# Patient Record
Sex: MALE | Race: WHITE | URBAN - METROPOLITAN AREA
[De-identification: names, ages, dates, MRNs, and addresses within clinical notes are randomized per-mention and may not be internally consistent; named-entity substitution may affect disease eponyms.]

---

## 2021-09-15 ENCOUNTER — OFFICE VISIT (OUTPATIENT)
Dept: SLEEP MEDICINE | Age: 41
End: 2021-09-15
Payer: COMMERCIAL

## 2021-09-15 VITALS
BODY MASS INDEX: 25.17 KG/M2 | OXYGEN SATURATION: 100 % | HEIGHT: 72 IN | SYSTOLIC BLOOD PRESSURE: 110 MMHG | DIASTOLIC BLOOD PRESSURE: 69 MMHG | WEIGHT: 185.8 LBS | HEART RATE: 63 BPM

## 2021-09-15 DIAGNOSIS — G47.33 OSA (OBSTRUCTIVE SLEEP APNEA): Primary | ICD-10-CM

## 2021-09-15 PROCEDURE — 99204 OFFICE O/P NEW MOD 45 MIN: CPT | Performed by: SPECIALIST

## 2021-09-15 NOTE — PROGRESS NOTES
217 [facility] [place], [address]  Tel.  [phone]  Fax. 100 [facility] [address]  [place], [address] [facility]  Tel.  [phone]  Fax. [phone] [address]  Tel.  [phone]  Fax. [phone]       Chief Complaint       No chief complaint on file. SALAS Jacobo is 35 y.o. male seen for evaluation of a sleep disorder. He notes problems with nocturnal awakening. Previously would retire between 8: 309 PM and awaken at 4: 30 AM.  More recently he has been awakening at 1: 30 and experiencing difficulties returning to sleep. He has been told of snoring which is intermittent, primarily supine. He denies excessive daytime sleepiness. He has not experienced vivid dreaming nightmares, sleep talking or sleepwalking, bruxism or nocturnal incontinence, abnormal arm or leg movements, hypnagogic hallucinations, sleep paralysis or cataplexy. The patient has not undergone diagnostic testing for the current problems. [place] Sleepiness Score: 1       Not on File         He  has no past medical history on file. He  has no past surgical history on file. He family history is not on file. He       Review of Systems:  Review of Systems   Constitutional: Negative for chills and fever. HENT: Negative for hearing loss and tinnitus. Eyes: Negative for blurred vision and double vision. Respiratory: Negative for cough and shortness of breath. Cardiovascular: Negative for chest pain and palpitations. Gastrointestinal: Negative for heartburn and nausea. Genitourinary: Negative for frequency and urgency. Musculoskeletal: Negative for back pain and neck pain. Skin: Negative for itching and rash. Neurological: Negative for dizziness. Psychiatric/Behavioral: The patient is nervous/anxious and has insomnia.           Objective:     Visit Vitals  /69   Pulse 63   Ht 6' (1.829 m)   Wt 185 lb 12.8 oz (84.3 kg)   SpO2 100%   BMI 25.20 kg/m²     Body mass index is 25.2 kg/m². General:   Conversant, cooperative   Eyes:  Pupils equal and reactive, no nystagmus   Oropharynx:   Mallampati score II, tongue normal   Tonsils:   tonsils 2+   Neck:   No carotid bruits; Chest/Lungs:  Clear on auscultation    CVS:  Normal rate, regular rhythm   Skin:  Warm to touch; no obvious rashes   Neuro:  Speech fluent, face symmetrical, tongue movement normal   Psych:  Normal affect,  normal countenance        Assessment:       ICD-10-CM ICD-9-CM    1. RANJEET (obstructive sleep apnea)  G47.33 327.23 SPLIT CPAP/PSG      CANCELED: SLEEP STUDY UNATTENDED, 4 CHANNEL       Potential sleep disordered breathing prompting awakening. Patient does have a narrow posterior oral airway, secondary to enlarged tonsils. Potentially, sleep breathing abnormalities more prominent when supine, and/or in rem sleep. Sleep study will be obtained. Plan:     Orders Placed This Encounter    SPLIT CPAP/PSG     Standing Status:   Future     Standing Expiration Date:   3/18/2022     Order Specific Question:   Reason for Exam     Answer:   nocturnal awakening       * Patient has a history and examination consistent with the diagnosis of sleep apnea. * Sleep testing was ordered for initial evaluation. * He was provided information on sleep apnea including corresponding risk factors and the importance of proper treatment. * Treatment options if indicated were reviewed today. Instructions:  o Do not engage in activities requiring a normal degree of alertness if fatigue is present. o The patient understands that untreated or undertreated sleep apnea could impair judgement and the ability to function normally during the day.  o Call or return if symptoms worsen or persist.          Ignacia Emery MD, FAASM  Electronically signed 09/15/21       This note was created using voice recognition software.  Despite editing, there may be syntax errors. This note will not be viewable in [address]

## 2021-10-12 ENCOUNTER — HOSPITAL ENCOUNTER (OUTPATIENT)
Dept: SLEEP MEDICINE | Age: 41
Discharge: HOME | End: 2021-10-12
Payer: COMMERCIAL

## 2021-10-12 DIAGNOSIS — G47.33 OSA (OBSTRUCTIVE SLEEP APNEA): ICD-10-CM

## 2021-10-12 PROCEDURE — 95810 POLYSOM 6/> YRS 4/> PARAM: CPT | Performed by: SPECIALIST

## 2021-10-13 ENCOUNTER — DOCUMENTATION ONLY (OUTPATIENT)
Dept: SLEEP MEDICINE | Age: 41
End: 2021-10-13

## 2021-10-13 VITALS
HEART RATE: 71 BPM | TEMPERATURE: 98.4 F | OXYGEN SATURATION: 99 % | DIASTOLIC BLOOD PRESSURE: 68 MMHG | SYSTOLIC BLOOD PRESSURE: 108 MMHG

## 2021-10-13 NOTE — PROGRESS NOTES
217 [facility]., [place], [address]  Tel.  [phone]  Fax. 100 [facility] 60  [place], [address] [facility]  Tel.  [phone]  Fax. [phone] [address]  Tel.  [phone]  Fax. [phone]     Sleep Study Technical Notes        PRE-Test:  · Cabrera Foster (: 1980) arrived in the lobby. Patient was greeted, temperature checked (98.4 ) and screening questions asked. The patient was taken to the [facility] Center and taken directly to his/her room. BP (108/68) and SaO2 (99%) were taken. Procedure explained to the patient and questions were answered. The patient expressed understanding of the procedure. Electrodes were applied without incident. The patient was placed in bed and the study was started. Acquisition Notes:  · Lights off: 10:07pm    · Respiratory events: None noted  · ECG:  NSR  · Other comments: The study ordered was a Split-Night Polysomnogram. The hookup was completed without issue. The Pt was placed in bed and lights were off at 10:07pm. Sleep onset occured around 10:47pm.    During the diagnostic portion of the study the Pt slept on all sides. The Pt did not appear to show signs of sleep-disordered breathing. Therefore, the diagnostic portion of the study was extended in hopes of finding evidence of sleep-disordered breathing. Lights were on at 4:45am per the Pt request.    POST Test:  Patient was awakened. Electrodes were removed. The patient was discharged after answering the Post Questionnaire. Patient stated that they were alert and ok to drive. Equipment and room cleaned per infection control policy.

## 2021-10-27 ENCOUNTER — TELEPHONE (OUTPATIENT)
Dept: SLEEP MEDICINE | Age: 41
End: 2021-10-27

## 2021-10-28 NOTE — TELEPHONE ENCOUNTER
PSG performed for potential sleep disordered breathing.  397.5 minutes recorded of which 241.5 minutes spent asleep with a sleep efficiency of 60.8%. Sleep onset at 33.5 minutes; REM onset at 262.5 minutes with total REM representing 6% of sleep time. 1 hypopnea occurred. AHI 0.2/h. Minimal SaO2 93%. Mild snoring noted. Impression: PSG did not demonstrate significant abnormalities. Results reviewed with the patient.

## 2021-10-29 NOTE — TELEPHONE ENCOUNTER
Patient informed of sleep study results. Discussed positional therapy as well as possibly purchasing a slumber bump to use during sleep.